# Patient Record
Sex: FEMALE | Race: WHITE | NOT HISPANIC OR LATINO | Employment: UNEMPLOYED | URBAN - METROPOLITAN AREA
[De-identification: names, ages, dates, MRNs, and addresses within clinical notes are randomized per-mention and may not be internally consistent; named-entity substitution may affect disease eponyms.]

---

## 2017-02-22 ENCOUNTER — APPOINTMENT (OUTPATIENT)
Dept: WOMENS IMAGING | Facility: HOSPITAL | Age: 62
End: 2017-02-22

## 2017-02-22 PROCEDURE — 77067 SCR MAMMO BI INCL CAD: CPT | Performed by: RADIOLOGY

## 2017-02-22 PROCEDURE — G0202 SCR MAMMO BI INCL CAD: HCPCS | Performed by: RADIOLOGY

## 2017-11-07 ENCOUNTER — ANESTHESIA (OUTPATIENT)
Dept: GASTROENTEROLOGY | Facility: HOSPITAL | Age: 62
End: 2017-11-07

## 2017-11-07 ENCOUNTER — ANESTHESIA EVENT (OUTPATIENT)
Dept: GASTROENTEROLOGY | Facility: HOSPITAL | Age: 62
End: 2017-11-07

## 2017-11-07 ENCOUNTER — HOSPITAL ENCOUNTER (OUTPATIENT)
Facility: HOSPITAL | Age: 62
Setting detail: HOSPITAL OUTPATIENT SURGERY
Discharge: HOME | End: 2017-11-07
Attending: SURGERY | Admitting: SURGERY

## 2017-11-07 VITALS
SYSTOLIC BLOOD PRESSURE: 110 MMHG | DIASTOLIC BLOOD PRESSURE: 69 MMHG | WEIGHT: 121.38 LBS | BODY MASS INDEX: 22.92 KG/M2 | HEART RATE: 70 BPM | HEIGHT: 61 IN | TEMPERATURE: 97.7 F | OXYGEN SATURATION: 99 % | RESPIRATION RATE: 16 BRPM

## 2017-11-07 PROCEDURE — 25010000002 PROPOFOL 10 MG/ML EMULSION: Performed by: NURSE ANESTHETIST, CERTIFIED REGISTERED

## 2017-11-07 RX ORDER — PROPOFOL 10 MG/ML
VIAL (ML) INTRAVENOUS AS NEEDED
Status: DISCONTINUED | OUTPATIENT
Start: 2017-11-07 | End: 2017-11-07 | Stop reason: SURG

## 2017-11-07 RX ORDER — PROPOFOL 10 MG/ML
VIAL (ML) INTRAVENOUS CONTINUOUS PRN
Status: DISCONTINUED | OUTPATIENT
Start: 2017-11-07 | End: 2017-11-07 | Stop reason: SURG

## 2017-11-07 RX ORDER — SODIUM CHLORIDE, SODIUM LACTATE, POTASSIUM CHLORIDE, CALCIUM CHLORIDE 600; 310; 30; 20 MG/100ML; MG/100ML; MG/100ML; MG/100ML
1000 INJECTION, SOLUTION INTRAVENOUS CONTINUOUS PRN
Status: DISCONTINUED | OUTPATIENT
Start: 2017-11-07 | End: 2017-11-07 | Stop reason: HOSPADM

## 2017-11-07 RX ORDER — LIDOCAINE HYDROCHLORIDE 20 MG/ML
INJECTION, SOLUTION INFILTRATION; PERINEURAL AS NEEDED
Status: DISCONTINUED | OUTPATIENT
Start: 2017-11-07 | End: 2017-11-07 | Stop reason: SURG

## 2017-11-07 RX ADMIN — PROPOFOL 100 MG: 10 INJECTION, EMULSION INTRAVENOUS at 08:08

## 2017-11-07 RX ADMIN — SODIUM CHLORIDE, POTASSIUM CHLORIDE, SODIUM LACTATE AND CALCIUM CHLORIDE: 600; 310; 30; 20 INJECTION, SOLUTION INTRAVENOUS at 08:01

## 2017-11-07 RX ADMIN — LIDOCAINE HYDROCHLORIDE 60 MG: 20 INJECTION, SOLUTION INFILTRATION; PERINEURAL at 08:08

## 2017-11-07 RX ADMIN — SODIUM CHLORIDE, POTASSIUM CHLORIDE, SODIUM LACTATE AND CALCIUM CHLORIDE 1000 ML: 600; 310; 30; 20 INJECTION, SOLUTION INTRAVENOUS at 07:25

## 2017-11-07 RX ADMIN — PROPOFOL 200 MCG/KG/MIN: 10 INJECTION, EMULSION INTRAVENOUS at 08:08

## 2017-11-07 NOTE — ANESTHESIA POSTPROCEDURE EVALUATION
Patient: Izabella Jurado    Procedure Summary     Date Anesthesia Start Anesthesia Stop Room / Location    11/07/17 0801 0833  [facility]       Procedure Diagnosis Surgeon Provider    COLONOSCOPY TO CECUM (N/A ) No diagnosis on file. MD Arlet Carlisle MD          Anesthesia Type: MAC  Last vitals  BP   101/75 (11/07/17 0844)   Temp   36.5 °C (97.7 °F) (11/07/17 0844)   Pulse   65 (11/07/17 0844)   Resp   16 (11/07/17 0844)     SpO2   97 % (11/07/17 0844)     Post Anesthesia Care and Evaluation    Patient location during evaluation: PACU  Patient participation: complete - patient participated  Level of consciousness: awake  Pain score: 0  Pain management: adequate  Airway patency: patent  Anesthetic complications: No anesthetic complications    Cardiovascular status: acceptable  Respiratory status: acceptable  Hydration status: acceptable  No anesthesia care post op

## 2017-11-07 NOTE — ANESTHESIA PREPROCEDURE EVALUATION
Anesthesia Evaluation     Patient summary reviewed and Nursing notes reviewed   NPO Solid Status: > 8 hours  NPO Liquid Status: > 8 hours     Airway   Mallampati: I  TM distance: <3 FB  Neck ROM: full  no difficulty expected  Dental - normal exam     Pulmonary - negative pulmonary ROS and normal exam   Cardiovascular - negative cardio ROS and normal exam        Neuro/Psych- negative ROS  GI/Hepatic/Renal/Endo - negative ROS     Musculoskeletal (-) negative ROS    Abdominal  - normal exam    Bowel sounds: normal.   Substance History - negative use     OB/GYN negative ob/gyn ROS         Other                                      Anesthesia Plan    ASA 1     MAC     Anesthetic plan and risks discussed with patient and spouse/significant other.

## 2017-11-07 NOTE — H&P
Date: 2017     Patient: Izabella Jurado    : 1955   [id number]       History:   The patient is a 61 y.o. female of No Known Provider  who presents for screening colonoscopy. The patient currently has no complaints.  She  denies any history of nausea, abdominal pain, weight loss, change in bowel habits or rectal bleeding.   Family history is positive for for colon cancer or polyps.  Mother had colon cancer  The patient has had a colonoscopy in the past- normal about 5 years ago.      The following portions of the patient's history were reviewed and updated as appropriate: allergies, current medications, past family history, past medical history, past social history, past surgical history and problem list.    Past Medical History:   Diagnosis Date   • Anemia    • Hypercholesterolemia       Past Surgical History:   Procedure Laterality Date   • BREAST SURGERY      enlargement procedure, x's 2   • COLONOSCOPY      dr borrero / angela   • EYE SURGERY      laser resurfacing with eye lift   • OTHER SURGICAL HISTORY      shoulder manipulation requiring anesthesia, X's 2 ( and )   • OTHER SURGICAL HISTORY      vaginal sling operation for stress incontinence     Medications:   Prescriptions Prior to Admission   Medication Sig Dispense Refill Last Dose   • Calcium Carbonate (CALCIUM 600 PO) Take 2 tablets by mouth Daily.   2017 at Unknown time   • doxycycline (ORACEA) 40 MG capsule Take 40 mg by mouth As Needed.   2017 at Unknown time   • estrogen, conjugated,-medroxyprogesterone (PREMPRO) 0.3-1.5 MG per tablet Take 1 tablet by mouth Daily. 84 tablet 3 2017 at Unknown time   • IRON PO Take 1 tablet by mouth Daily.   2017 at Unknown time   • Multiple Vitamin (MULTI VITAMIN DAILY) tablet Take 1 tablet by mouth Daily.   2017 at Unknown time   • simvastatin (ZOCOR) 20 MG tablet Take 1/2 tablet by mouth daily 5 times per week 30 tablet 3 2017 at Unknown time   • zolpidem (AMBIEN) 10 MG  tablet Take 1 tablet by mouth At Night As Needed for sleep. 30 tablet 0 Past Week at Unknown time      Allergies: Review of patient's allergies indicates no known allergies.   Social History:  Social History     Social History   • Marital status:      Spouse name: N/A   • Number of children: N/A   • Years of education: N/A     Social History Main Topics   • Smoking status: Former Smoker   • Smokeless tobacco: Never Used      Comment: > 34 YRS AGO   • Alcohol use Yes      Comment: 2 drink per month   • Drug use: No   • Sexual activity: Defer     Other Topics Concern   • None     Social History Narrative        Family History   Problem Relation Age of Onset   • Colon cancer Mother    • Other Father      arteriosclerotic cardiovascular disease (ascvd)   • Heart disease Father    • Malig Hyperthermia Neg Hx           Review of Systems:   Negative  ros    Physical Examination:  General - well developed  female in no distress.  HEENT - pupils are equal, conjunctiva are pink, sclera are non-icteric.  Mouth - mucous membranes are moist. Speech is normal with no hoarseness.  Neck - supple, no adenopathy or masses, no JVD.  Chest - clear.  Heart - regular rate and rhythm.  Abomen - soft, non-tender, non-distended, no masses or hernias.   Ext - no edema or cyanosis, capillary refill brisk.    Impression:  61 y.o. female for screening colonoscopy.    Plan:  Screening colonoscopy.  Generalities of the procedure were described.  Risks of bleeding and/or bowel perforation were discussed.      Signature: No name on file.     CC: No Known Provider

## 2017-11-07 NOTE — PLAN OF CARE
Problem: Patient Care Overview (Adult)  Goal: Plan of Care Review  Outcome: Ongoing (interventions implemented as appropriate)    11/07/17 0704   Coping/Psychosocial Response Interventions   Plan Of Care Reviewed With patient       Goal: Adult Individualization and Mutuality  Outcome: Ongoing (interventions implemented as appropriate)    11/07/17 0704   Individualization   Patient Specific Preferences none       Goal: Discharge Needs Assessment  Outcome: Ongoing (interventions implemented as appropriate)    11/07/17 0704   Discharge Needs Assessment   Concerns To Be Addressed no discharge needs identified   Living Environment   Transportation Available family or friend will provide         Problem: GI Endoscopy (Adult)  Goal: Signs and Symptoms of Listed Potential Problems Will be Absent or Manageable (GI Endoscopy)  Outcome: Ongoing (interventions implemented as appropriate)    11/07/17 0704   GI Endoscopy   Problems Assessed (GI Endoscopy) pain   Problems Present (GI Endoscopy) none

## 2017-11-07 NOTE — OP NOTE
Colonoscopy Procedure Note    Pre-operative Diagnosis: screening for colon cancer    Post-operative Diagnosis: normal    Procedure: Colonoscopy --screening    Surgeon:  Danny Main M.D,  F.A.C.S.    Sedation: MAC    Procedure Details:  Informed consent was obtained for the procedure, including sedation.  Risks of perforation, hemorrhage, were discussed.The patient was monitored continuously with ECG tracing, pulse oximetry, blood pressure monitoring, and direct observations. The patient was placed in the left lateral decubitus position. The patient was given intravenous sedation by anesthesia.     A digital rectal examination was performed.  The variable-stiffness pediatric colonoscope was inserted into the rectum and advanced under direct vision to the cecum, which was identified by the ileocecal valve and appendiceal orifice.  The quality of the colonic preparation was good. A retroflexed view of the right colon was normal.     Careful inspection was made as the colonoscope was withdrawn, including a retroflexed view of the rectum.  No abnormalities were seen anywhere in the colon.  Minimal diverticulosis was seen.  No inflammatory changes or polyps were seen.  Digital exam of the rectum was performed which revealed an external hemorrhoid.    Colonoscopy findings:  -normal colonic mucosa throughout  -no polyps    Specimens: none           Complications:  none    Recommendations:  -For colon cancer screening in this patient, a repeat colonoscopy is recommended in   5 years.        Signature: Danny Main MD

## 2018-10-19 ENCOUNTER — APPOINTMENT (OUTPATIENT)
Dept: WOMENS IMAGING | Facility: HOSPITAL | Age: 63
End: 2018-10-19

## 2018-10-19 PROCEDURE — 77063 BREAST TOMOSYNTHESIS BI: CPT | Performed by: RADIOLOGY

## 2018-10-19 PROCEDURE — 77080 DXA BONE DENSITY AXIAL: CPT | Performed by: RADIOLOGY

## 2018-10-19 PROCEDURE — 77067 SCR MAMMO BI INCL CAD: CPT | Performed by: RADIOLOGY

## 2019-11-06 ENCOUNTER — APPOINTMENT (OUTPATIENT)
Dept: WOMENS IMAGING | Facility: HOSPITAL | Age: 64
End: 2019-11-06

## 2019-11-06 PROCEDURE — 77067 SCR MAMMO BI INCL CAD: CPT | Performed by: RADIOLOGY

## 2019-11-06 PROCEDURE — 77063 BREAST TOMOSYNTHESIS BI: CPT | Performed by: RADIOLOGY

## 2020-11-09 ENCOUNTER — APPOINTMENT (OUTPATIENT)
Dept: WOMENS IMAGING | Facility: HOSPITAL | Age: 65
End: 2020-11-09

## 2020-11-09 PROCEDURE — 77063 BREAST TOMOSYNTHESIS BI: CPT | Performed by: RADIOLOGY

## 2020-11-09 PROCEDURE — 77067 SCR MAMMO BI INCL CAD: CPT | Performed by: RADIOLOGY

## 2021-03-19 ENCOUNTER — BULK ORDERING (OUTPATIENT)
Dept: CASE MANAGEMENT | Facility: OTHER | Age: 66
End: 2021-03-19

## 2021-03-19 DIAGNOSIS — Z23 IMMUNIZATION DUE: ICD-10-CM

## 2023-03-31 ENCOUNTER — APPOINTMENT (OUTPATIENT)
Dept: WOMENS IMAGING | Facility: HOSPITAL | Age: 68
End: 2023-03-31
Payer: COMMERCIAL

## 2023-03-31 PROCEDURE — 77067 SCR MAMMO BI INCL CAD: CPT | Performed by: RADIOLOGY

## 2023-03-31 PROCEDURE — 77063 BREAST TOMOSYNTHESIS BI: CPT | Performed by: RADIOLOGY

## 2024-04-02 ENCOUNTER — APPOINTMENT (OUTPATIENT)
Dept: WOMENS IMAGING | Facility: HOSPITAL | Age: 69
End: 2024-04-02
Payer: COMMERCIAL

## 2024-04-02 PROCEDURE — 77067 SCR MAMMO BI INCL CAD: CPT | Performed by: RADIOLOGY

## 2024-04-02 PROCEDURE — 77063 BREAST TOMOSYNTHESIS BI: CPT | Performed by: RADIOLOGY

## 2024-09-09 ENCOUNTER — OFFICE VISIT (OUTPATIENT)
Dept: ORTHOPEDIC SURGERY | Facility: CLINIC | Age: 69
End: 2024-09-09
Payer: COMMERCIAL

## 2024-09-09 VITALS — TEMPERATURE: 98.7 F | HEIGHT: 61 IN | BODY MASS INDEX: 23.22 KG/M2 | WEIGHT: 123 LBS

## 2024-09-09 DIAGNOSIS — M22.2X1 PATELLOFEMORAL ARTHRALGIA OF BOTH KNEES: Primary | ICD-10-CM

## 2024-09-09 DIAGNOSIS — G89.29 BILATERAL CHRONIC KNEE PAIN: ICD-10-CM

## 2024-09-09 DIAGNOSIS — M22.2X2 PATELLOFEMORAL ARTHRALGIA OF BOTH KNEES: Primary | ICD-10-CM

## 2024-09-09 DIAGNOSIS — M25.561 BILATERAL CHRONIC KNEE PAIN: ICD-10-CM

## 2024-09-09 DIAGNOSIS — M25.562 BILATERAL CHRONIC KNEE PAIN: ICD-10-CM

## 2024-09-09 PROCEDURE — 73562 X-RAY EXAM OF KNEE 3: CPT | Performed by: NURSE PRACTITIONER

## 2024-09-09 PROCEDURE — 99204 OFFICE O/P NEW MOD 45 MIN: CPT | Performed by: NURSE PRACTITIONER

## 2024-09-09 PROCEDURE — 20610 DRAIN/INJ JOINT/BURSA W/O US: CPT | Performed by: NURSE PRACTITIONER

## 2024-09-09 RX ORDER — LIDOCAINE HYDROCHLORIDE 10 MG/ML
2 INJECTION, SOLUTION EPIDURAL; INFILTRATION; INTRACAUDAL; PERINEURAL
Status: COMPLETED | OUTPATIENT
Start: 2024-09-09 | End: 2024-09-09

## 2024-09-09 RX ORDER — METHYLPREDNISOLONE ACETATE 80 MG/ML
80 INJECTION, SUSPENSION INTRA-ARTICULAR; INTRALESIONAL; INTRAMUSCULAR; SOFT TISSUE
Status: COMPLETED | OUTPATIENT
Start: 2024-09-09 | End: 2024-09-09

## 2024-09-09 RX ADMIN — METHYLPREDNISOLONE ACETATE 80 MG: 80 INJECTION, SUSPENSION INTRA-ARTICULAR; INTRALESIONAL; INTRAMUSCULAR; SOFT TISSUE at 10:35

## 2024-09-09 RX ADMIN — LIDOCAINE HYDROCHLORIDE 2 ML: 10 INJECTION, SOLUTION EPIDURAL; INFILTRATION; INTRACAUDAL; PERINEURAL at 10:35

## 2024-09-09 NOTE — PROGRESS NOTES
[facility]              New Problem      Patient Name: Izabella Jurado  : 1955  Primary Care Physician: Chantal Gallardo APRN        Chief Complaint: Bilateral knee pain    HPI:   Izabella Jurado is a 68 y.o. year old who presents today for evaluation.  Patient has history of bilateral knee pains been going on now for some time symptoms have been recently worsening.  She is very active lady, she enjoys [employer] and goes to [employer].  She states that she has noticed some anterior knee pain particularly with box jumps and she had to modify those to do step ups even still with a little bit of pain.  She says she still has some pain with stairs and squatting.  No history of injury.  She manages her symptoms with cryotherapy, she does lots of stretching.  She tends to avoid oral medications and anti-inflammatories for the most part tries to take more of a natural approach to treating symptoms.      Past Medical/Surgical, Social and Family History:  I have reviewed and/or updated pertinent history as noted in the medical record including:  Past Medical History:   Diagnosis Date    Anemia     Hypercholesterolemia      Past Surgical History:   Procedure Laterality Date    BREAST SURGERY      enlargement procedure, x's 2    COLONOSCOPY      dr main / angela    COLONOSCOPY N/A 2017    Procedure: COLONOSCOPY TO CECUM;  Surgeon: Danny Main MD;  Location: [facility] ENDOSCOPY;  Service:     EYE SURGERY      laser resurfacing with eye lift    OTHER SURGICAL HISTORY      shoulder manipulation requiring anesthesia, X's 2 ( and )    OTHER SURGICAL HISTORY      vaginal sling operation for stress incontinence     Social History     Occupational History    Not on file   Tobacco Use    Smoking status: Former    Smokeless tobacco: Never    Tobacco comments:     > 34 YRS AGO   Substance and Sexual Activity    Alcohol use: Yes     Comment: 2 drink per month    Drug use: No    Sexual activity: Defer           Allergies: No Known Allergies    Medications:   Home Medications:  Current Outpatient Medications on File Prior to Visit   Medication Sig    Calcium Carbonate (CALCIUM 600 PO) Take 2 tablets by mouth Daily.    estrogen, conjugated,-medroxyprogesterone (PREMPRO) 0.3-1.5 MG per tablet Take 1 tablet by mouth Daily.    Multiple Vitamin (MULTI VITAMIN DAILY) tablet Take 1 tablet by mouth Daily.    simvastatin (ZOCOR) 20 MG tablet Take 1/2 tablet by mouth daily 5 times per week    zolpidem (AMBIEN) 10 MG tablet Take 1 tablet by mouth At Night As Needed for sleep.    doxycycline (ORACEA) 40 MG capsule Take 40 mg by mouth As Needed.    IRON PO Take 1 tablet by mouth Daily.     No current facility-administered medications on file prior to visit.         ROS:  ROS negative except as listed in the HPI.    Physical Exam:   68 y.o. female  Body mass index is 23.25 kg/m²., 55.8 kg (123 lb)  Vitals:    09/09/24 1002   Temp: 98.7 °F (37.1 °C)     General: Alert, cooperative, appears well and in no observable distress. Appears stated age and BMI as listed above.  HEENT: Normocephalic, atraumatic on external visual inspection.  CV: No significant peripheral edema.  Respiratory: Normal respiratory effort.  Skin: Warm & well perfused; appropriate skin turgor.  Psych: Appropriate mood & affect.  Neuro: Gross sensation and motor intact in affected extremity/extremities.  Vascular: Peripheral pulses palpable in affected extremity/extremities.     MSK Exam:  Bilateral Knee  No deformity or wounds appreciated. No significant redness or warmth.  Trace effusion noted  Tenderness along the joint line appreciated patellofemoral compartment  ROM 0-130, +crepitus  Ligamentous exam grossly stable  Quad strength 4 /5    Brief hip exam in the affected extremity(ies) grossly unremarkable.  Moves ankle and toes up and down, no significant pain or swelling in the foot, ankle or calf.      Radiology:    The following X-rays were  ordered/reviewed today to evaluate the patient's symptoms: Bilateral Knee: AP standing, lateral, and sunrise of both knees show some mild to moderate degenerative changes of the patellofemoral joint.  On the AP view she has great maintenance of joint space.  Lateral and sunrise view there are some subtle subchondral cystic changes on the right more so than the left.  No other obvious acute or chronic bony pathology to account for symptoms.  There are no prior films available for comparison.    Procedure:   See Procedure Note: The potential risks and benefits of performing a diagnostic and therapeutic injection were discussed with the patient prior to procedure. Risks include, but are not limited to infection, swelling, transient increase in pain, bleeding, bruising. Patient was advised that injections are a diagnostic and therapeutic tool meaning they may not alleviate symptoms at all, or may only provide partial or temporary relief. Injection precautions and aftercare discussed.    Large Joint Arthrocentesis: R knee  Date/Time: 9/9/2024 10:35 AM  Consent given by: patient  Site marked: site marked  Timeout: Immediately prior to procedure a time out was called to verify the correct patient, procedure, equipment, support staff and site/side marked as required   Supporting Documentation  Indications: pain and joint swelling   Procedure Details  Location: knee - R knee  Preparation: Patient was prepped and draped in the usual sterile fashion  Needle size: 22 G  Approach: anterolateral  Medications administered: 80 mg methylPREDNISolone acetate 80 MG/ML; 2 mL lidocaine PF 1% 1 %  Patient tolerance: patient tolerated the procedure well with no immediate complications      Large Joint Arthrocentesis: L knee  Date/Time: 9/9/2024 10:35 AM  Consent given by: patient  Site marked: site marked  Timeout: Immediately prior to procedure a time out was called to verify the correct patient, procedure, equipment, support staff and  site/side marked as required   Supporting Documentation  Indications: pain and joint swelling   Procedure Details  Location: knee - L knee  Preparation: Patient was prepped and draped in the usual sterile fashion  Needle size: 22 G  Approach: anterolateral  Medications administered: 80 mg methylPREDNISolone acetate 80 MG/ML; 2 mL lidocaine PF 1% 1 %  Patient tolerance: patient tolerated the procedure well with no immediate complications           Misc. Data/Labs: N/A    Assessment & Plan:    ICD-10-CM ICD-9-CM   1. Patellofemoral arthralgia of both knees  M22.2X1 719.46    M22.2X2    2. Bilateral chronic knee pain  M25.561 719.46    M25.562 338.29    G89.29      No orders of the defined types were placed in this encounter.    Orders Placed This Encounter   Procedures    XR Knee 3 View Bilateral    Ambulatory Referral to Physical Therapy for Evaluation & Treatment     Is a 68-year-old female with bilateral knee pain which I suspect is related to patellofemoral arthritis.  She does not have any mechanical symptoms to suggest meniscal pathology but I would keep it in the differential.  We talked about different treatment options including oral anti-inflammatories for short course, injection therapies as well as physical therapy.  I think the box jumps are pretty intense for patellofemoral pathology when she is having pain she should really stick with step ups or modify those activities but really her goal is to get back to being able to do everything.  I would like to also have her see physical therapy just to make sure we are optimizing her strength training and stretching routine.  She would like to try injections and I think that is certainly reasonable.    Continue with activity modifications as needed/discussed.  Continue ICE and/or HEAT PRN.  Recommend to continue activity as tolerated, focus on quad and hip/core strengthening as well as gentle stretching.  Recommend lowering or maintaining BMI within a healthy  range to reduce symptoms.   Patient encouraged to call with questions or concerns prior to follow up.  Will discuss with attending as needed.   Consider additional referrals, work up and/or advanced imaging as indicated or if patient fails to respond to conservative care.    Return in about 6 weeks (around 10/21/2024) for Recheck. If symptoms change call for sooner appt..        WAYNE Farai    Dictation software was used to complete a portion or all of this note.

## 2024-09-11 ENCOUNTER — PATIENT ROUNDING (BHMG ONLY) (OUTPATIENT)
Dept: ORTHOPEDIC SURGERY | Facility: CLINIC | Age: 69
End: 2024-09-11
Payer: COMMERCIAL

## 2024-09-11 NOTE — PROGRESS NOTES
A [employer] Message has been sent to the patient for PATIENT R[place]ING with [facility]

## 2024-09-17 ENCOUNTER — TREATMENT (OUTPATIENT)
Dept: PHYSICAL THERAPY | Facility: CLINIC | Age: 69
End: 2024-09-17
Payer: COMMERCIAL

## 2024-09-17 DIAGNOSIS — M17.10 ARTHRITIS OF KNEE: ICD-10-CM

## 2024-09-17 DIAGNOSIS — M25.561 ACUTE PAIN OF BOTH KNEES: Primary | ICD-10-CM

## 2024-09-17 DIAGNOSIS — R53.1 WEAKNESS: ICD-10-CM

## 2024-09-17 DIAGNOSIS — M25.562 ACUTE PAIN OF BOTH KNEES: Primary | ICD-10-CM

## 2024-09-17 DIAGNOSIS — R26.9 GAIT DISTURBANCE: ICD-10-CM

## 2024-09-19 ENCOUNTER — TREATMENT (OUTPATIENT)
Dept: PHYSICAL THERAPY | Facility: CLINIC | Age: 69
End: 2024-09-19
Payer: COMMERCIAL

## 2024-09-19 DIAGNOSIS — M25.561 ACUTE PAIN OF BOTH KNEES: Primary | ICD-10-CM

## 2024-09-19 DIAGNOSIS — R26.9 GAIT DISTURBANCE: ICD-10-CM

## 2024-09-19 DIAGNOSIS — R53.1 WEAKNESS: ICD-10-CM

## 2024-09-19 DIAGNOSIS — M17.10 ARTHRITIS OF KNEE: ICD-10-CM

## 2024-09-19 DIAGNOSIS — M25.562 ACUTE PAIN OF BOTH KNEES: Primary | ICD-10-CM

## 2024-10-30 ENCOUNTER — TELEPHONE (OUTPATIENT)
Dept: PHYSICAL THERAPY | Facility: OTHER | Age: 69
End: 2024-10-30
Payer: COMMERCIAL

## 2024-10-30 NOTE — TELEPHONE ENCOUNTER
Caller: Izabella Jurado    Relationship: Self    What was the call regarding: PATIENT CANCELLED ALL APPTS BECAUSE SHE IS GOING TO THE GYM INSTEAD OF PT

## 2025-01-23 ENCOUNTER — APPOINTMENT (OUTPATIENT)
Dept: GENERAL RADIOLOGY | Facility: HOSPITAL | Age: 70
End: 2025-01-23
Payer: COMMERCIAL

## 2025-01-23 ENCOUNTER — HOSPITAL ENCOUNTER (EMERGENCY)
Facility: HOSPITAL | Age: 70
Discharge: HOME | End: 2025-01-23
Attending: EMERGENCY MEDICINE
Payer: COMMERCIAL

## 2025-01-23 VITALS
DIASTOLIC BLOOD PRESSURE: 95 MMHG | WEIGHT: 125 LBS | TEMPERATURE: 97.6 F | SYSTOLIC BLOOD PRESSURE: 138 MMHG | HEART RATE: 78 BPM | RESPIRATION RATE: 13 BRPM | OXYGEN SATURATION: 97 % | BODY MASS INDEX: 23.63 KG/M2

## 2025-01-23 DIAGNOSIS — S43.015A ANTERIOR DISLOCATION OF LEFT SHOULDER, INITIAL ENCOUNTER: Primary | ICD-10-CM

## 2025-01-23 PROCEDURE — 73030 X-RAY EXAM OF SHOULDER: CPT

## 2025-01-23 PROCEDURE — 25010000002 FENTANYL CITRATE (PF) 50 MCG/ML SOLUTION: Performed by: EMERGENCY MEDICINE

## 2025-01-23 PROCEDURE — 25010000002 PROPOFOL 10 MG/ML EMULSION: Performed by: PHYSICIAN ASSISTANT

## 2025-01-23 PROCEDURE — 73020 X-RAY EXAM OF SHOULDER: CPT

## 2025-01-23 PROCEDURE — 96374 THER/PROPH/DIAG INJ IV PUSH: CPT

## 2025-01-23 PROCEDURE — 99285 EMERGENCY DEPT VISIT HI MDM: CPT

## 2025-01-23 RX ORDER — PROPOFOL 10 MG/ML
100 VIAL (ML) INTRAVENOUS ONCE
Status: COMPLETED | OUTPATIENT
Start: 2025-01-23 | End: 2025-01-23

## 2025-01-23 RX ORDER — FENTANYL CITRATE 50 UG/ML
50 INJECTION, SOLUTION INTRAMUSCULAR; INTRAVENOUS ONCE
Status: COMPLETED | OUTPATIENT
Start: 2025-01-23 | End: 2025-01-23

## 2025-01-23 RX ORDER — NAPROXEN 500 MG/1
500 TABLET ORAL 2 TIMES DAILY PRN
Qty: 15 TABLET | Refills: 0 | Status: SHIPPED | OUTPATIENT
Start: 2025-01-23

## 2025-01-23 RX ORDER — SODIUM CHLORIDE 0.9 % (FLUSH) 0.9 %
10 SYRINGE (ML) INJECTION AS NEEDED
Status: DISCONTINUED | OUTPATIENT
Start: 2025-01-23 | End: 2025-01-23 | Stop reason: HOSPADM

## 2025-01-23 RX ADMIN — FENTANYL CITRATE 50 MCG: 50 INJECTION, SOLUTION INTRAMUSCULAR; INTRAVENOUS at 12:50

## 2025-01-23 RX ADMIN — PROPOFOL 60 MG: 10 INJECTION, EMULSION INTRAVENOUS at 13:17

## 2025-01-23 NOTE — ED PROVIDER NOTES
MD ATTESTATION NOTE    SHARED VISIT: This visit was performed by BOTH a physician and an APC. The substantive portion of the medical decision making was performed by this attesting physician who made or approved the management plan and takes responsibility for patient management. All studies documented in the APC note (if performed) were independently interpreted by me.    The SCOTT and I have discussed this patient's history, physical exam, MDM, and treatment plan.  I have reviewed the documentation and personally had a face to face interaction with the patient. The attached note describes my personal findings.      Izabella Jurado is a 69 y.o. female who presents to the ED c/o acute left shoulder pain.  This happened after falling on ice.    On exam:  GENERAL: not distressed  HENT: nares patent  EYES: no scleral icterus  CV: regular rhythm, regular rate, 2+ left radial pulse  RESPIRATORY: normal effort  ABDOMEN: soft  MUSCULOSKELETAL: Left shoulder deformity  NEURO: alert, intact motor and sensory left median/radial/ulnar nerves  SKIN: warm, dry    Labs  No results found for this or any previous visit (from the past 24 hours).    Radiology  XR Shoulder 2+ View Left    Result Date: 1/23/2025  XR SHOULDER 2+ VW LEFT-  INDICATIONS: Trauma  TECHNIQUE: 5 VIEWS of the left shoulder  COMPARISON: None available  FINDINGS:  The humeral head is anteromedially dislocated in relation to the glenoid. No acute fracture or erosion is identified. Mild hypertrophic degenerative changes seen at the acromioclavicular joint.       Left shoulder dislocation.    This report was finalized on 1/23/2025 12:49 PM by Dr. Rommel Tobar M.D on Workstation: [id number]       Medications given in the ED:  Medications   sodium chloride 0.9 % flush 10 mL (has no administration in time range)   Propofol (DIPRIVAN) injection 100 mg (has no administration in time range)   fentaNYL citrate (PF) (SUBLIMAZE) injection 50 mcg (50 mcg Intravenous Given  1/23/25 1250)       Orders placed during this visit:  Orders Placed This Encounter   Procedures    XR Shoulder 2+ View Left    XR Shoulder 1 View Left    Insert Peripheral IV       Medical Decision Making:  ED Course as of 01/23/25 1349   Thu Jan 23, 2025   1348 X-ray of the left shoulder independently interpreted by myself.  Successful reduction. [TD]      ED Course User Index  [TD] Matteo Lopez II, MD       Differential diagnosis:  Fracture, dislocation      Procedural Sedation    Date/Time: 1/23/2025 1:24 PM    Performed by: Matteo Lopez II, MD  Authorized by: Matteo Lopez II, MD    Consent:     Consent obtained:  Written    Consent given by:  Patient    Risks discussed:  Allergic reaction, dysrhythmia, inadequate sedation, nausea, vomiting, respiratory compromise necessitating ventilatory assistance and intubation and prolonged hypoxia resulting in organ damage  Indications:     Procedure performed:  Dislocation reduction    Procedure necessitating sedation performed by:  Physician performing sedation    Intended level of sedation:  Moderate  Pre-sedation assessment:     Time since last food or drink:  4 hrs    ASA classification: class 1 - normal, healthy patient    Immediate pre-procedure details:     Reassessment: Patient reassessed immediately prior to procedure      Reviewed: vital signs      Verified: bag valve mask available, emergency equipment available, intubation equipment available, IV patency confirmed, oxygen available and suction available    Procedure details (see MAR for exact dosages):     Sedation start time:  1/23/2025 1:15 PM    Preoxygenation:  Nasal cannula    Sedation:  Propofol    Analgesia:  Fentanyl    Intra-procedure monitoring:  Blood pressure monitoring, cardiac monitor, continuous capnometry, continuous pulse oximetry, frequent LOC assessments and frequent vital sign checks    Intra-procedure events: none      Sedation end time:  1/23/2025 1:21 PM    Total  sedation time (minutes):  6  Post-procedure details:     Procedure completion:  Tolerated well, no immediate complications  Upper Extremity Dislocation    Date/Time: 1/23/2025 1:25 PM    Performed by: Matteo Lopez II, MD  Authorized by: Matteo Lopez II, MD  Consent given by: patient  Injury location: shoulder  Location details: left shoulder  Injury type: dislocation  Dislocation type: anterior  Hill-Sachs deformity: no  Chronicity: new  Pre-procedure neurovascular assessment: neurovascularly intact  Pre-procedure distal perfusion: normal  Pre-procedure neurological function: normal  Pre-procedure range of motion: reduced    Anesthesia:  Local anesthesia used: no  Manipulation performed: yes  Reduction method: elbow technique.  Reduction successful: yes  X-ray confirmed reduction: yes  Immobilization: sling  Post-procedure neurovascular assessment: post-procedure neurovascularly intact  Post-procedure distal perfusion: normal  Post-procedure neurological function: normal  Post-procedure range of motion: normal            Diagnosis  Final diagnoses:   Anterior dislocation of left shoulder, initial encounter     DISCHARGE    FOLLOW-UP  Chantal Gallardo, APRN  [address]   [address]  [place] [address]  [phone]    Schedule an appointment as soon as possible for a visit in 2 days      Zane Irwin II, MD  [address] [facility]  [address]  [place] [address]  [phone]    Schedule an appointment as soon as possible for a visit in 1 week  As needed         Medication List        New Prescriptions      naproxen 500 MG tablet  Commonly known as: NAPROSYN  Take 1 tablet by mouth 2 (Two) Times a Day As Needed for Mild Pain.               Where to Get Your Medications        These medications were sent to [facility]pharmacy #[id number] - [place], [place] - [address] - [phone]  - [phone] [place] [address]      Phone: [phone]   naproxen 500 MG  Matteo Harmon II, MD  01/23/25 9300

## 2025-01-23 NOTE — ED PROVIDER NOTES
EMERGENCY DEPARTMENT ENCOUNTER      Room Number:  21/21  PCP: Chantal Gallardo APRN  Independent Historians: Patient  Patient Care Team:  Chantal Gallardo APRN as PCP - General (Family Medicine)  Provider, No Known as PCP - Family Medicine       HPI:  Chief Complaint: Left shoulder pain    A complete HPI/ROS/PMH/PSH/SH/FH are unobtainable due to: None    Chronic or social conditions impacting patient care (Social Determinants of Health): None      Context: Izabella Jurado is a 69 y.o. female with a PMH significant for hyperlipidemia who presents to the ED c/o acute left shoulder pain after falling on the ice.  The patient has had decreased range of motion and an obvious deformity since that time.  No numbness or tingling distally to the affected extremity.  No other injuries sustained at that time.  Specifically no head injury or chest or abdomen injury.  She has not had medications to alleviate symptoms prior to arrival.      Upon review of prior external notes (non-ED) -and- Review of prior external test results outside of this encounter it appears the patient was evaluated in the office with orthopedics for patellofemoral arthralgia of both knees on September 9, 2024.  The patient had a normal ferritin and iron profile on 10/7/2021.      PAST MEDICAL HISTORY  Active Ambulatory Problems     Diagnosis Date Noted    Hypercholesterolemia 02/04/2016    Menopause present 02/04/2016    Annual physical exam 02/11/2016    Insomnia 02/11/2016    Rosacea 02/11/2016    Encounter for screening colonoscopy 02/11/2016     Resolved Ambulatory Problems     Diagnosis Date Noted    No Resolved Ambulatory Problems     Past Medical History:   Diagnosis Date    Anemia          PAST SURGICAL HISTORY  Past Surgical History:   Procedure Laterality Date    BREAST SURGERY      enlargement procedure, x's 2    COLONOSCOPY      dr main / angela    COLONOSCOPY N/A 11/7/2017    Procedure: COLONOSCOPY TO CECUM;  Surgeon: Danny Main MD;   Location: [facility] ENDOSCOPY;  Service:     EYE SURGERY      laser resurfacing with eye lift    OTHER SURGICAL HISTORY      shoulder manipulation requiring anesthesia, X's 2 (2006 and 2008)    OTHER SURGICAL HISTORY      vaginal sling operation for stress incontinence         FAMILY HISTORY  Family History   Problem Relation Age of Onset    Colon cancer Mother     Other Father         arteriosclerotic cardiovascular disease (ascvd)    Heart disease Father     Malfer Hyperthermia Neg Hx          SOCIAL HISTORY  Social History     Socioeconomic History    Marital status:    Tobacco Use    Smoking status: Former    Smokeless tobacco: Never    Tobacco comments:     > 34 YRS AGO   Substance and Sexual Activity    Alcohol use: Yes     Comment: 2 drink per month    Drug use: No    Sexual activity: Defer         ALLERGIES  Patient has no known allergies.      REVIEW OF SYSTEMS  Included in HPI  All systems reviewed and negative except for those discussed in HPI.      PHYSICAL EXAM    I have reviewed the triage vital signs and nursing notes.    ED Triage Vitals   Temp Heart Rate Resp BP SpO2   01/23/25 1207 01/23/25 1207 01/23/25 1207 01/23/25 1209 01/23/25 1207   97.6 °F (36.4 °C) 95 18 128/94 97 %      Temp src Heart Rate Source Patient Position BP Location FiO2 (%)   -- -- -- -- --              Physical Exam  Constitutional:       General: She is not in acute distress.     Appearance: She is well-developed.   HENT:      Head: Normocephalic and atraumatic.   Eyes:      General: No scleral icterus.     Conjunctiva/sclera: Conjunctivae normal.   Neck:      Trachea: No tracheal deviation.   Cardiovascular:      Rate and Rhythm: Normal rate and regular rhythm.   Pulmonary:      Effort: Pulmonary effort is normal.      Breath sounds: Normal breath sounds.   Abdominal:      Palpations: Abdomen is soft.      Tenderness: There is no abdominal tenderness.   Musculoskeletal:         General: No deformity.      Left shoulder:  Deformity and tenderness present. Decreased range of motion.      Cervical back: Normal range of motion.   Lymphadenopathy:      Cervical: No cervical adenopathy.   Skin:     General: Skin is warm and dry.   Neurological:      Mental Status: She is alert and oriented to person, place, and time.      Sensory: Sensation is intact.      Motor: Motor function is intact.   Psychiatric:         Behavior: Behavior normal.         Vital signs and nursing notes reviewed.      PPE: I wore a surgical mask throughout my encounters with the pt. I performed hand hygiene on entry into the pt room and upon exit.     LAB RESULTS  No results found for this or any previous visit (from the past 24 hours).      RADIOLOGY  XR Shoulder 1 View Left    Result Date: 1/23/2025  XR SHOULDER 1 VW LEFT-  INDICATIONS: Postreduction evaluation    TECHNIQUE: Frontal views of the left shoulder  COMPARISON: 1/23/2025  FINDINGS:  The humeral head appears normally located in relation to the glenoid. No acute fracture, erosion, or dislocation is identified.       Post reduction changes.    This report was finalized on 1/23/2025 1:41 PM by Dr. Rommel Tobar M.D on Workstation: [id number]      XR Shoulder 2+ View Left    Result Date: 1/23/2025  XR SHOULDER 2+ VW LEFT-  INDICATIONS: Trauma  TECHNIQUE: 5 VIEWS of the left shoulder  COMPARISON: None available  FINDINGS:  The humeral head is anteromedially dislocated in relation to the glenoid. No acute fracture or erosion is identified. Mild hypertrophic degenerative changes seen at the acromioclavicular joint.       Left shoulder dislocation.    This report was finalized on 1/23/2025 12:49 PM by Dr. Rommel Tobar M.D on Workstation: [id number]         MEDICATIONS GIVEN IN ER  Medications   sodium chloride 0.9 % flush 10 mL (has no administration in time range)   fentaNYL citrate (PF) (SUBLIMAZE) injection 50 mcg (50 mcg Intravenous Given 1/23/25 1250)   Propofol (DIPRIVAN) injection 100 mg (60 mg  Intravenous Given by Other 1/23/25 8197)         ORDERS PLACED DURING THIS VISIT:  Orders Placed This Encounter   Procedures    Procedural Sedation    FX Dislocation Initial    Orthopedic Injury Treatment    [facility] 02.  Shoulder Immobilizer/Sling    XR Shoulder 2+ View Left    XR Shoulder 1 View Left    Insert Peripheral IV         OUTPATIENT MEDICATION MANAGEMENT:  Current Facility-Administered Medications Ordered in Epic   Medication Dose Route Frequency Provider Last Rate Last Admin    sodium chloride 0.9 % flush 10 mL  10 mL Intravenous PRN Matteo Lopez II, MD         Current Outpatient Medications Ordered in Epic   Medication Sig Dispense Refill    Calcium Carbonate (CALCIUM 600 PO) Take 2 tablets by mouth Daily.      doxycycline (ORACEA) 40 MG capsule Take 40 mg by mouth As Needed.      estrogen, conjugated,-medroxyprogesterone (PREMPRO) 0.3-1.5 MG per tablet Take 1 tablet by mouth Daily. 84 tablet 3    IRON PO Take 1 tablet by mouth Daily.      Multiple Vitamin (MULTI VITAMIN DAILY) tablet Take 1 tablet by mouth Daily.      naproxen (NAPROSYN) 500 MG tablet Take 1 tablet by mouth 2 (Two) Times a Day As Needed for Mild Pain. 15 tablet 0    simvastatin (ZOCOR) 20 MG tablet Take 1/2 tablet by mouth daily 5 times per week 30 tablet 3    zolpidem (AMBIEN) 10 MG tablet Take 1 tablet by mouth At Night As Needed for sleep. 30 tablet 0         PROCEDURES  FX Dislocation    Date/Time: 1/23/2025 1:25 PM    Performed by: Leonard Rodriguez PA  Authorized by: Matteo Lopez II, MD    Consent:     Consent obtained:  Verbal    Consent given by:  Patient    Risks, benefits, and alternatives were discussed: yes      Risks discussed:  Pain and nerve damage    Alternatives discussed:  No treatment  Universal protocol:     Imaging studies available: yes      Patient identity confirmed:  Arm band and verbally with patient  Injury:     Injury location:  Shoulder    Shoulder injury location:  L  shoulder    Hill-Sachs deformity: no    Pre-procedure details:     Distal neurologic exam:  Normal    Distal perfusion: distal pulses strong and brisk capillary refill      Range of motion: reduced    Sedation:     Sedation type:  Moderate sedation  Procedure details:     Manipulation performed: yes      Reduction successful: yes      X-ray confirmed reduction: yes      Immobilization:  Sling    Supplies used:  Sling  Post-procedure details:     Distal neurologic exam:  Normal    Distal perfusion: distal pulses strong and brisk capillary refill      Range of motion: improved      Procedure completion:  Tolerated well, no immediate complications          PROGRESS, DATA ANALYSIS, CONSULTS, AND MEDICAL DECISION MAKING  All labs have been independently interpreted by me.  All radiology studies have been reviewed by me. All EKG's have been independently viewed and interpreted by me.  Discussion below represents my analysis of pertinent findings related to patient's condition, differential diagnosis, treatment plan and final disposition.    Patient presentation and evaluation most consistent with acute anterior left shoulder dislocation with subsequent reduction in the ER after moderate sedation.  She is appropriate for outpatient management with orthopedic follow-up as needed.  No worrisome findings indicate the need for emergent consultation with orthopedics at this time.  All questions answered and close return precautions given.    DIFFERENTIAL DIAGNOSIS INCLUDE BUT NOT LIMITED TO:     Shoulder fracture, shoulder strain, shoulder dislocation    Clinical Scores: N/A      ED Course as of 01/23/25 1349   Thu Jan 23, 2025   1348 X-ray of the left shoulder independently interpreted by myself.  Successful reduction. [TD]      ED Course User Index  [TD] Matteo Lopez II, MD         3283 I rechecked the patient.  I discussed the patient's radiology findings (including all incidental findings), diagnosis, and plan for  discharge.  A repeat exam reveals no new worrisome changes from my initial exam findings.  The patient understands that the fact that they are being discharged does not denote that nothing is abnormal, it indicates that no clinical emergency is present and that they must follow-up as directed in order to properly maintain their health.  Follow-up instructions (specifically listed below) and return to ER precautions were given at this time.  I specifically instructed the patient to follow-up with their PCP.  The patient understands and agrees with the plan, and is ready for discharge.  All questions answered.         AS OF 13:49 EST VITALS:    BP - 125/79  HR - 70  TEMP - 97.6 °F (36.4 °C)  O2 SATS - 97%    COMPLEXITY OF CARE  Admission was considered but after careful review of the patient's presentation, physical examination, diagnostic results, and response to treatment the patient may be safely discharged with outpatient follow-up.      DIAGNOSIS  Final diagnoses:   Anterior dislocation of left shoulder, initial encounter         DISPOSITION  ED Disposition       ED Disposition   Discharge    Condition   Stable    Comment   --                Please note that portions of this document were completed with a voice recognition program.    Note Disclaimer: At [facility], we believe that sharing information builds trust and better relationships. You are receiving this note because you recently visited [facility]. It is possible you will see health information before a provider has talked with you about it. This kind of information can be easy to misunderstand. To help you fully understand what it means for your health, we urge you to discuss this note with your provider.         Leonard Rodriguez PA  01/23/25 6626

## 2025-01-23 NOTE — ED NOTES
Pt via PV from home with c/o L shoulder injury after slip and fall on ice; denies hittin head but there is abrasion on chin

## 2025-03-17 ENCOUNTER — TRANSCRIBE ORDERS (OUTPATIENT)
Dept: PHYSICAL THERAPY | Facility: CLINIC | Age: 70
End: 2025-03-17
Payer: COMMERCIAL

## 2025-03-17 DIAGNOSIS — M75.112 INCOMPLETE ROTATOR CUFF TEAR OR RUPTURE OF LEFT SHOULDER, NOT SPECIFIED AS TRAUMATIC: Primary | ICD-10-CM

## 2025-03-19 ENCOUNTER — TREATMENT (OUTPATIENT)
Dept: PHYSICAL THERAPY | Facility: CLINIC | Age: 70
End: 2025-03-19
Payer: COMMERCIAL

## 2025-03-19 DIAGNOSIS — S43.005D DISLOCATION OF LEFT SHOULDER JOINT, SUBSEQUENT ENCOUNTER: Primary | ICD-10-CM

## 2025-03-19 DIAGNOSIS — S46.012D TRAUMATIC INCOMPLETE TEAR OF LEFT ROTATOR CUFF, SUBSEQUENT ENCOUNTER: ICD-10-CM

## 2025-03-19 DIAGNOSIS — M25.619 POSTTRAUMATIC STIFFNESS OF SHOULDER JOINT: ICD-10-CM

## 2025-03-19 DIAGNOSIS — S42.292D CLOSED FRACTURE OF HEAD OF LEFT HUMERUS WITH ROUTINE HEALING, SUBSEQUENT ENCOUNTER: ICD-10-CM

## 2025-03-19 NOTE — PROGRESS NOTES
[facility]  [facility]  [address] [place] [address]    Initial Evaluation and Plan of Care      Patient: Izabella Jurado   : 1955  Diagnosis/ICD-10 Code:  Dislocation of left shoulder joint, subsequent encounter [S43.005D]  Referring practitioner: Delta Cornell, *  Date of Initial Visit: 3/19/2025  Today's Date: 3/19/2025  Patient seen for 1 session         Visit Diagnoses:    ICD-10-CM ICD-9-CM   1. Dislocation of left shoulder joint, subsequent encounter  S43.005D V58.89   2. Traumatic incomplete tear of left rotator cuff, subsequent encounter  S46.012D V58.89     840.4   3. Closed fracture of head of left humerus with routine healing, subsequent encounter  S42.292D V54.11   4. Posttraumatic stiffness of shoulder joint  M25.619 719.51         Subjective Questionnaire: SPADI: 34%      Subjective Evaluation    History of Present Illness  Date of onset: 2025  Mechanism of injury: FOOSH forward onto her L UE on ice. Anterior dislocation reduced in the ER.No fx found.  Ortho a week later with MRI showing an incomplete tear of her rotator cuff and a humeral head fx per pt. MRI done at ortho so not available in EPIC right now. Pt will bring in report to scan.   Sling and swathe worn for 6 weeks until 3-10. Now back to doing AROM and even trying to lift kettle bells with her R UE.   Thinks she has a 5# wt lift limit.   No plans for injections.  States that cuff tear seemed significant on MRI but based on her recovery after immobilization her prognosis is good for non surgical approach.   No longer on pain meds or hydrocodone  Significant PMH of L frozen shoulder in  had to be manipulated. In  had R shoulder frozen and manipulated. Both regained full ROM. Denies diabetic dx.       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   Precautions and Work Restrictions: 5#Quality of life: excellent    Pain  Current pain  ratin  At best pain ratin  At worst pain ratin  Location: mid arm  Quality: sharp, dull ache, discomfort and knife-like  Relieving factors: change in position  Aggravating factors: sleeping, prolonged positioning, outstretched reach and movement  Progression: improved    Social Support  Lives with: spouse    Hand dominance: left    Diagnostic Tests  X-ray: normal  MRI studies: abnormal    Treatments  Previous treatment: immobilization  Patient Goals  Patient goals for therapy: increased motion, return to sport/leisure activities and increased strength  Patient goal: stay in shape while injury heals         Objective          Palpation   Left   Hypertonic in the biceps.     Tenderness     Left Shoulder   Tenderness in the supraspinatus tendon.     Active Range of Motion   Left Shoulder   External rotation BTH: T1   Internal rotation BTB: T10     Right Shoulder   Normal active range of motion  External rotation BTH: T3   Internal rotation BTB: T5     Passive Range of Motion   Left Shoulder   Flexion: 155 degrees with pain  Extension: 50 degrees with pain  Abduction: 130 degrees with pain  External rotation 90°: 70 degrees with pain  Internal rotation 90°: 65 degrees with pain    Right Shoulder   Flexion: 180 degrees   Extension: 75 degrees   Abduction: 180 degrees   External rotation 90°: 100 degrees   Internal rotation 90°: 90 degrees     Additional Passive Range of Motion Details  470 L vs. 625 R = 75%    Strength/Myotome Testing     Left Shoulder     Planes of Motion   Flexion: 3-   Abduction: 3-   External rotation at 0°: 3-   Internal rotation at 0°: 3+     Isolated Muscles   Middle trapezius: 3-     Right Shoulder   Normal muscle strength    Ambulation   Weight-Bearing Status   Assistive device used: none          Assessment & Plan       Assessment  Impairments: abnormal or restricted ROM, impaired physical strength, lacks appropriate home exercise program and pain with function   Functional  limitations: carrying objects, lifting, sleeping, pulling, pushing, uncomfortable because of pain, moving in bed, reaching overhead and unable to perform repetitive tasks   Assessment details: Pt with evolving condition making quick progress since out of her sling 9 days ago.   Pt with co-morbidity of prior capsulitis of both shoulders but this was many years ago and resolved with PT and manipulation. Stated she had full ROM prior to fall on 1-23-25.   Personal factor of being very involved in this gym with her  and small group thus eager to get back to prior routine. Has already leaned heavily into the TRX straps and is lifting kettle balls heavier than her 5# wt limit. Pt agrees to try and follow a slower PT plan for now.   Pt in need of skilled PT to help her slowly regain her strength and improve her ROM which was found to be at 75% compared to R UE.   Prognosis: good    Goals  Plan Goals: STGs 4 weeks:  1. Improve SPADI score from 44/130 34%, to < 24%  2. Improve total ROM (flex, abd, ER, IR, ext) from 75 to > 85%  3. Improve L UE strength from 3- to 4-/5 with full ROM and very light weight  4. Pt 75% indep in variety of gym and HEP  LTGs 12 weeks:  1. SPADI score < 14%  2. Total PROM to > 95%  3. L shoulder strength to 4/5 lifting moderate weights  4. Pt able to join her small group personal training sessions with good awareness of limitation and precautions.     Plan  Therapy options: will be seen for skilled therapy services  Planned therapy interventions: manual therapy, soft tissue mobilization, strengthening, stretching, therapeutic activities, gait training, functional ROM exercises and home exercise program  Frequency: 2x week  Duration in weeks: 12  Treatment plan discussed with: patient    History # of Personal Factors and/or Comorbidities: MODERATE (1-2)  Examination of Body System(s): # of elements: LOW (1-2)  Clinical Presentation: EVOLVING  Clinical Decision Making: MODERATE      Timed:          Manual Therapy:         mins  50394;     Therapeutic Exercise:    15     mins  28267;     Neuromuscular Дмитрий:        mins  72762;    Therapeutic Activity:          mins  30461;     Gait Training:           mins  53212;     Ultrasound:          mins  83906;    Ionto                                   mins   44529  Self Care                            mins   17711  Canalith Repos         mins 30825  Aquatic Exercise                 mins 76184    Un-Timed:  Electrical Stimulation:         mins  05265 ( );  Dry Needling          mins self-pay  Traction          mins 16721    Low Eval          Mins  37426  Mod Eval     35     Mins  92811  High Eval                            Mins  00343        Timed Treatment:   15   mins   Total Treatment:     50   mins          PT: Zorre Zeno Kimura, PT     [place] License Number: [id number]  [place] License Number:  [id number]  Electronically signed by Zorre Zeno Kimura, PT, 03/19/25, 2:20 PM EDT    Certification Period: 3/19/2025 thru 6/16/2025  I certify that the therapy services are furnished while this patient is under my care.  The services outlined above are required by this patient, and will be reviewed every 90 days.         Physician Signature:__________________________________________________    PHYSICIAN: Delta Cornell APRN  NPI: [id number]                                      DATE:      Please sign and return via fax to [employer]  [address] [place] [address]. [phone]  Thank you, [facility].

## 2025-04-23 ENCOUNTER — TREATMENT (OUTPATIENT)
Dept: PHYSICAL THERAPY | Facility: CLINIC | Age: 70
End: 2025-04-23
Payer: COMMERCIAL

## 2025-04-23 DIAGNOSIS — S46.012D TRAUMATIC INCOMPLETE TEAR OF LEFT ROTATOR CUFF, SUBSEQUENT ENCOUNTER: ICD-10-CM

## 2025-04-23 DIAGNOSIS — S42.292D CLOSED FRACTURE OF HEAD OF LEFT HUMERUS WITH ROUTINE HEALING, SUBSEQUENT ENCOUNTER: ICD-10-CM

## 2025-04-23 DIAGNOSIS — M25.612 POST-TRAUMATIC STIFFNESS OF LEFT SHOULDER JOINT: ICD-10-CM

## 2025-04-23 DIAGNOSIS — S43.005D DISLOCATION, SHOULDER CLOSED, LEFT, SUBSEQUENT ENCOUNTER: Primary | ICD-10-CM

## 2025-04-23 NOTE — PROGRESS NOTES
Physical Therapy Daily Treatment Note    [facility]  [address] [place] [address]      Patient: Izabella Jurado   : 1955  Referring practitioner: Delta Cornell, *  Date of Initial Visit: Type: THERAPY  Noted: 3/19/2025  Today's Date: 2025  Patient seen for 2 sessions       Visit Diagnoses:    ICD-10-CM ICD-9-CM   1. Dislocation, shoulder closed, left, subsequent encounter  S43.005D V58.89   2. Traumatic incomplete tear of left rotator cuff, subsequent encounter  S46.012D V58.89     840.4   3. Closed fracture of head of left humerus with routine healing, subsequent encounter  S42.292D V54.11   4. Post-traumatic stiffness of left shoulder joint  M25.612 719.51       Subjective Evaluation    History of Present Illness    Subjective comment: shoulder doing much better. lots of rest vs. wt lifting so break helped a lot. ortho released her from care.       Objective   See Exercise, Manual, and Modality Logs for complete treatment.     Passive Range of Motion   Eval       25  Left Shoulder   Flexion: 155 degrees with pain   160  Extension: 50 degrees with pain   55  Abduction: 130 degrees with pain   125  External rotation 90°: 70 degrees with pain  80  Internal rotation 90°: 65 degrees with pain  55  Total ROM               475/625=76%    Access Code: [email or web address]  URL: [email or web address]  Date: 2025  Prepared by: Zorre Kimura    Exercises  - Prone Shoulder Extension - Single Arm  - 1 x daily - 3 x weekly - 2 sets - 10 reps  - Prone Single Arm Shoulder Horizontal Abduction with Scapular Retraction and Palm Down  - 1 x daily - 3 x weekly - 2 sets - 10 reps  - Shoulder Scaption AAROM with Dowel  - 1 x daily - 7 x weekly - 2 sets - 10 reps  NEW EX  - Supine Shoulder Flexion Extension Full Range AROM  - 1 x daily - 3 x weekly - 2 sets - 10 reps - 1-2# hold  - Supine Single Arm Press with Dumbbell  - 1 x daily - 3 x weekly - 2 sets - 10 reps - 4=5# hold  - Supine  Shoulder Circles with Weight  - 1 x daily - 3 x weekly - 2 sets - 10 reps - 4-5# hold  - Sidelying Shoulder External Rotation with Dumbbell  - 1 x daily - 3 x weekly - 2 sets - 10 reps - 1-2# hold  - Sidelying Shoulder Abduction Full Range of Motion  - 1 x daily - 7 x weekly - 10 reps - 1# hold    Assessment & Plan       Assessment  Assessment details: Pt with high pain tolerance considering prior dislocation and MRI findings of inferior glenohumeral ligament tear, partial tears of supraspinatus and infraspinatus, and labral tear. All appear to be manageable with conservative care vs. Surgery per ortho so will try to offer pt progressive strengthening.   Pt wanting to do some planks and other ex with her group training class. I will discuss MRI findings and ex plan with her  which pt has authorized.     P: isometric training next visit        Goals  Plan Goals: STGs 4 weeks:  1. Improve SPADI score from 44/130 34%, to < 24%. NOT MET. Declined to 47%   2. Improve total ROM (flex, abd, ER, IR, ext) from 75 to > 85%. NOT MET. 76%  3. Improve L UE strength from 3- to 4-/5 with full ROM and very light weight. MET. Using 1-4# for supine, prone and SL ex  4. Pt 75% indep in variety of gym and HEP. Progressing  LTGs 12 weeks:  1. SPADI score < 14%. NOT MET  2. Total PROM to > 95%. NOT MET  3. L shoulder strength to 4/5 lifting moderate weights. NOT MET  4. Pt able to join her small group personal training sessions with good awareness of limitation and precautions. MET    Timed:         Manual Therapy:         mins  74116;     Therapeutic Exercise:    25     mins  98967;     Neuromuscular Дмитрий:        mins  18405;    Therapeutic Activity:          mins  30745;     Gait Training:           mins  77707;     Ultrasound:          mins  80163;    Ionto                                   mins   33022  Self Care                       10     mins   28913  [facility]         mins 47926  Work hardening   __   min  09468/36497      Un-Timed:  Electrical Stimulation:         mins  04218 ( );  Dry Needling          mins self-pay  Traction          mins 91967      Timed Treatment:   35   mins   Total Treatment:    45    mins    Zorre Zeno Kimura, PT  [place] License: [id number]    [place] License:  [id number]

## 2025-04-28 ENCOUNTER — TREATMENT (OUTPATIENT)
Dept: PHYSICAL THERAPY | Facility: CLINIC | Age: 70
End: 2025-04-28
Payer: COMMERCIAL

## 2025-04-28 DIAGNOSIS — S42.292D CLOSED FRACTURE OF HEAD OF LEFT HUMERUS WITH ROUTINE HEALING, SUBSEQUENT ENCOUNTER: ICD-10-CM

## 2025-04-28 DIAGNOSIS — M25.612 POST-TRAUMATIC STIFFNESS OF LEFT SHOULDER JOINT: ICD-10-CM

## 2025-04-28 DIAGNOSIS — S43.005D DISLOCATION, SHOULDER CLOSED, LEFT, SUBSEQUENT ENCOUNTER: Primary | ICD-10-CM

## 2025-04-28 DIAGNOSIS — S46.012D TRAUMATIC INCOMPLETE TEAR OF LEFT ROTATOR CUFF, SUBSEQUENT ENCOUNTER: ICD-10-CM

## 2025-04-28 PROCEDURE — 97110 THERAPEUTIC EXERCISES: CPT | Performed by: PHYSICAL THERAPIST

## 2025-04-28 NOTE — PROGRESS NOTES
Physical Therapy Daily Treatment Note    [facility]estone  [address] [place] [address]      Patient: Izabella Jurado   : 1955  Referring practitioner: Delta Cornell, *  Date of Initial Visit: Type: THERAPY  Noted: 3/19/2025  Today's Date: 2025  Patient seen for 3 sessions       Visit Diagnoses:    ICD-10-CM ICD-9-CM   1. Dislocation, shoulder closed, left, subsequent encounter  S43.005D V58.89   2. Traumatic incomplete tear of left rotator cuff, subsequent encounter  S46.012D V58.89     840.4   3. Closed fracture of head of left humerus with routine healing, subsequent encounter  S42.292D V54.11   4. Post-traumatic stiffness of left shoulder joint  M25.612 719.51       Subjective Evaluation    History of Present Illness    Subjective comment: feeling good. doing light workouts with group plus her HEP with 3#       Objective   See Exercise, Manual, and Modality Logs for complete treatment.   Access Code: [email or web address]  URL: [email or web address]  Date: 2025  Prepared by: Zorre Kimura    Exercises  - Prone Shoulder Extension - Single Arm  - 1 x daily - 3 x weekly - 2 sets - 10 reps  - Prone Single Arm Shoulder Horizontal Abduction with Scapular Retraction and Palm Down  - 1 x daily - 3 x weekly - 2 sets - 10 reps  - Shoulder Scaption AAROM with Dowel  - 1 x daily - 7 x weekly - 2 sets - 10 reps  - Supine Shoulder Flexion Extension Full Range AROM  - 1 x daily - 3 x weekly - 2 sets - 10 reps - 1-2# hold  - Supine Single Arm Press with Dumbbell  - 1 x daily - 3 x weekly - 2 sets - 10 reps - 4=5# hold  - Supine Shoulder Circles with Weight  - 1 x daily - 3 x weekly - 2 sets - 10 reps - 4-5#. start doing incline work hold  - Sidelying Shoulder External Rotation with Dumbbell  - 1 x daily - 3 x weekly - 2 sets - 10 reps - 1-2# hold  - Sidelying Shoulder Abduction Full Range of Motion  - 1 x daily - 7 x weekly - 10 reps - 1# hold  NEW EX  - Sidelying Shoulder Abduction Palm  Forward  - 1 x daily - 7 x weekly - 2 sets - 10 reps - circles. 2# hold  - Seated Elbow Flexion and Extension AROM  - 1 x daily - 7 x weekly - 2 sets - 10 reps - 4-5# hold  - Standing Tricep Extensions with Resistance  - 1 x daily - 7 x weekly - 2 sets - 10 reps - cable hold  - Standing Shoulder External Rotation AAROM with Dowel  - 1 x daily - 7 x weekly - 2 sets - 10 reps      Assessment & Plan       Assessment  Assessment details: Reviewed ex and added new ex. Focus on making sure pt understands she needs to use very specific wts for each ex vs. 3# which she was starting to do. Went over anatomy of the function of her RC with inherent weakness of supra and infraspinatus.     Added KT tape with improved OH reach to hair.     P: work on functional walk outs, walk back for isometrics          Timed:         Manual Therapy:    5     mins  21870;     Therapeutic Exercise:    40     mins  81775;     Neuromuscular Дмитрий:        mins  42617;    Therapeutic Activity:          mins  26118;     Gait Training:           mins  43737;     Ultrasound:          mins  46599;    Ionto                                   mins   20663  Self Care                            mins   92617  Canalith Repos         mins 19033  Work hardening   __   min 71623/28000      Un-Timed:  Electrical Stimulation:         mins  52912 ( );  Dry Needling          mins self-pay  Traction          mins 91238      Timed Treatment:   45   mins   Total Treatment:     45   mins    Zorre Zeno Kimura, PT  [place] License: [id number]    [place] License:  [id number]

## 2025-05-05 ENCOUNTER — TREATMENT (OUTPATIENT)
Dept: PHYSICAL THERAPY | Facility: CLINIC | Age: 70
End: 2025-05-05
Payer: COMMERCIAL

## 2025-05-05 DIAGNOSIS — S42.292D CLOSED FRACTURE OF HEAD OF LEFT HUMERUS WITH ROUTINE HEALING, SUBSEQUENT ENCOUNTER: ICD-10-CM

## 2025-05-05 DIAGNOSIS — S43.005D DISLOCATION, SHOULDER CLOSED, LEFT, SUBSEQUENT ENCOUNTER: Primary | ICD-10-CM

## 2025-05-05 DIAGNOSIS — M25.612 POST-TRAUMATIC STIFFNESS OF LEFT SHOULDER JOINT: ICD-10-CM

## 2025-05-05 DIAGNOSIS — S46.012D TRAUMATIC INCOMPLETE TEAR OF LEFT ROTATOR CUFF, SUBSEQUENT ENCOUNTER: ICD-10-CM

## 2025-05-05 PROCEDURE — 97140 MANUAL THERAPY 1/> REGIONS: CPT | Performed by: PHYSICAL THERAPIST

## 2025-05-05 PROCEDURE — 97110 THERAPEUTIC EXERCISES: CPT | Performed by: PHYSICAL THERAPIST

## 2025-05-05 NOTE — PROGRESS NOTES
Physical Therapy Daily Treatment Note    [facility]  [address] [place] [address]      Patient: Izabella Jurado   : 1955  Referring practitioner: Delta Cornell, *  Date of Initial Visit: Type: THERAPY  Noted: 3/19/2025  Today's Date: 2025  Patient seen for 4 sessions       Visit Diagnoses:    ICD-10-CM ICD-9-CM   1. Dislocation, shoulder closed, left, subsequent encounter  S43.005D V58.89   2. Traumatic incomplete tear of left rotator cuff, subsequent encounter  S46.012D V58.89     840.4   3. Closed fracture of head of left humerus with routine healing, subsequent encounter  S42.292D V54.11   4. Post-traumatic stiffness of left shoulder joint  M25.612 719.51       Subjective Evaluation    History of Present Illness    Subjective comment: feeling better. likes KT tape. ROM improving. Wants to try hand stand for yoga.       Objective   See Exercise, Manual, and Modality Logs for complete treatment.   Access Code: [email or web address]  URL: [email or web address]  Date: 2025  Prepared by: Zorre Kimura    Exercises  - Prone Shoulder Extension - Single Arm  - 1 x daily - 3 x weekly - 2 sets - 10 reps  - Prone Single Arm Shoulder Horizontal Abduction with Scapular Retraction and Palm Down  - 1 x daily - 3 x weekly - 2 sets - 10 reps  - Shoulder Scaption AAROM with Dowel  - 1 x daily - 7 x weekly - 2 sets - 10 reps  - Supine Shoulder Flexion Extension Full Range AROM  - 1 x daily - 3 x weekly - 2 sets - 10 reps - 1-2# hold  - Supine Single Arm Press with Dumbbell  - 1 x daily - 3 x weekly - 2 sets - 10 reps - 4=5# hold  - Supine Shoulder Circles with Weight  - 1 x daily - 3 x weekly - 2 sets - 10 reps - 4-5#. start doing incline work hold  - Sidelying Shoulder External Rotation with Dumbbell  - 1 x daily - 3 x weekly - 2 sets - 10 reps - 1-2# hold  - Sidelying Shoulder Abduction Full Range of Motion  - 1 x daily - 7 x weekly - 10 reps - 1# hold  - Sidelying Shoulder Abduction Palm  Forward  - 1 x daily - 7 x weekly - 2 sets - 10 reps - circles. 2# hold  - Seated Elbow Flexion and Extension AROM  - 1 x daily - 7 x weekly - 2 sets - 10 reps - 4-5# hold  - Standing Tricep Extensions with Resistance  - 1 x daily - 7 x weekly - 2 sets - 10 reps - cable hold  - Standing Shoulder External Rotation AAROM with Dowel  - 1 x daily - 7 x weekly - 2 sets - 10 reps  NEW ex  - Shoulder Internal Rotation Reactive Isometrics  - 1 x daily - 7 x weekly - 10 reps - med wt hold  - Shoulder External Rotation Reactive Isometrics  - 1 x daily - 7 x weekly - 10 reps - lt wt hold  - Standing Shoulder Row with Anchored Resistance  - 1 x daily - 7 x weekly - 10 reps - heavier wt. reactive steps hold  - Sidelying Open Book Thoracic Lumbar Rotation and Extension  - 1 x daily - 7 x weekly - 10 reps  - Supine Chest Stretch on Foam Roll  - 1 x daily - 7 x weekly - 10 reps  - Supine Thoracic Mobilization Foam Roll Horizontal with Arm Stretch  - 1 x daily - 7 x weekly - 10 reps  - Overhead Reach on Foam Roll  - 1 x daily - 7 x weekly - 10 reps    Assessment & Plan       Assessment  Assessment details: Much better ROM and addressed scapular tightness causing crepitus with abduction secondary to delayed upward rotation.   Deep tissue work done along with ex on full and half foam roll today.   Will get a massage from a therapist here. Cont to work on posture and scap retraction.   Reaching goals quickly and recommending she get her strength back before hand stands.           Timed:         Manual Therapy:    13     mins  17413;     Therapeutic Exercise:    25     mins  96589;     Neuromuscular Дмитрий:        mins  06985;    Therapeutic Activity:          mins  58967;     Gait Training:           mins  35719;     Ultrasound:          mins  87551;    Ionto                                   mins   20502  Self Care                            mins   33859  Canalith Repos         mins 74026  Work hardening   __   min  68314/95064      Un-Timed:  Electrical Stimulation:         mins  23388 ( );  Dry Needling          mins self-pay  Traction          mins 84328      Timed Treatment:   38   mins   Total Treatment:     48   mins    Zorre Zeno Kimura, PT  [place] License: [id number]    [place] License:  [id number]

## 2025-05-07 ENCOUNTER — TREATMENT (OUTPATIENT)
Dept: PHYSICAL THERAPY | Facility: CLINIC | Age: 70
End: 2025-05-07
Payer: COMMERCIAL

## 2025-05-07 DIAGNOSIS — S43.005D DISLOCATION, SHOULDER CLOSED, LEFT, SUBSEQUENT ENCOUNTER: Primary | ICD-10-CM

## 2025-05-07 DIAGNOSIS — S46.012D TRAUMATIC INCOMPLETE TEAR OF LEFT ROTATOR CUFF, SUBSEQUENT ENCOUNTER: ICD-10-CM

## 2025-05-07 DIAGNOSIS — M25.612 POST-TRAUMATIC STIFFNESS OF LEFT SHOULDER JOINT: ICD-10-CM

## 2025-05-07 DIAGNOSIS — S42.292D CLOSED FRACTURE OF HEAD OF LEFT HUMERUS WITH ROUTINE HEALING, SUBSEQUENT ENCOUNTER: ICD-10-CM

## 2025-05-07 NOTE — PROGRESS NOTES
Physical Therapy Daily Treatment Note    [facility]  [address] [place] [address]      Patient: Izabella Jurado   : 1955  Referring practitioner: Delta Cornell, *  Date of Initial Visit: Type: THERAPY  Noted: 3/19/2025  Today's Date: 2025  Patient seen for 5 sessions       Visit Diagnoses:    ICD-10-CM ICD-9-CM   1. Dislocation, shoulder closed, left, subsequent encounter  S43.005D V58.89   2. Traumatic incomplete tear of left rotator cuff, subsequent encounter  S46.012D V58.89     840.4   3. Closed fracture of head of left humerus with routine healing, subsequent encounter  S42.292D V54.11   4. Post-traumatic stiffness of left shoulder joint  M25.612 719.51       Subjective Evaluation    History of Present Illness    Subjective comment: getting stronger and feels she can cut back on PT. Worked with group ex today and dead lifted 85# today. Previously 140#. 3# OH lift on the L vs. 15#.     Objective   See Exercise, Manual, and Modality Logs for complete treatment.       Assessment & Plan       Assessment  Assessment details: Pt tired and a little sore from group ex earlier today so no ex during PT today.  Thoracic and scapular tension continues. Pt working on her posture and recommended postural shirts or devices if she can't reset her head on her thoracic spine better.   Pushing herself quite a bit with group ex but states she is being careful.    P: review function of cuff again and ex that could possibly extend her tear (IR stretching, long arm lifts, upright rows)          Timed:         Manual Therapy:    30     mins  21181;     Therapeutic Exercise:         mins  91045;     Neuromuscular Дмитрий:        mins  88922;    Therapeutic Activity:          mins  82587;     Gait Training:           mins  07931;     Ultrasound:          mins  46910;    Ionto                                   mins   22369  Self Care                       10     mins   15722  [facility]         mins  38902  Work hardening   __   min 85584/67610      Un-Timed:  Electrical Stimulation:         mins  36762 ( );  Dry Needling          mins self-pay  Traction          mins 12730      Timed Treatment:   40   mins   Total Treatment:     40   mins    Zorre Zeno Kimura, PT  [place] License: [id number]    [place] License:  [id number]

## 2025-05-12 ENCOUNTER — TREATMENT (OUTPATIENT)
Dept: PHYSICAL THERAPY | Facility: CLINIC | Age: 70
End: 2025-05-12
Payer: COMMERCIAL

## 2025-05-12 DIAGNOSIS — M25.612 POST-TRAUMATIC STIFFNESS OF LEFT SHOULDER JOINT: ICD-10-CM

## 2025-05-12 DIAGNOSIS — S42.292D CLOSED FRACTURE OF HEAD OF LEFT HUMERUS WITH ROUTINE HEALING, SUBSEQUENT ENCOUNTER: ICD-10-CM

## 2025-05-12 DIAGNOSIS — S43.005D DISLOCATION, SHOULDER CLOSED, LEFT, SUBSEQUENT ENCOUNTER: Primary | ICD-10-CM

## 2025-05-12 DIAGNOSIS — S46.012D TRAUMATIC INCOMPLETE TEAR OF LEFT ROTATOR CUFF, SUBSEQUENT ENCOUNTER: ICD-10-CM

## 2025-05-12 PROCEDURE — 97110 THERAPEUTIC EXERCISES: CPT | Performed by: PHYSICAL THERAPIST

## 2025-05-12 PROCEDURE — 97535 SELF CARE MNGMENT TRAINING: CPT | Performed by: PHYSICAL THERAPIST

## 2025-05-12 NOTE — PROGRESS NOTES
Physical Therapy Daily Treatment Note    [facility]  [address] [place] [address]      Patient: Izabella Jurado   : 1955  Referring practitioner: Delta Cornell, *  Date of Initial Visit: Type: THERAPY  Noted: 3/19/2025  Today's Date: 2025  Patient seen for 6 sessions       Visit Diagnoses:    ICD-10-CM ICD-9-CM   1. Dislocation, shoulder closed, left, subsequent encounter  S43.005D V58.89   2. Traumatic incomplete tear of left rotator cuff, subsequent encounter  S46.012D V58.89     840.4   3. Closed fracture of head of left humerus with routine healing, subsequent encounter  S42.292D V54.11   4. Post-traumatic stiffness of left shoulder joint  M25.612 719.51       Subjective Evaluation    History of Present Illness    Subjective comment: doing well. Working out with group. Discussed precautions for long arm motion, IR stretching, up right row. Pulling on her yoga pants is getting easier. Massage last week found quite a bit of trigger points and will go back again.       Objective   See Exercise, Manual, and Modality Logs for complete treatment.     Eval                                                                             25  Left Shoulder   Flexion: 155 degrees with pain                                   160    175  Extension: 50 degrees with pain                                55    55  Abduction: 130 degrees with pain                              125    145  External rotation 90°: 70 degrees with pain               80    85  Internal rotation 90°: 65 degrees with pain                 55    65  Total ROM                                                                  475/625=76%   525/625=84%    Assessment & Plan       Assessment  Assessment details: Doing well. ROM now 86%.  Added on sh ext ex, ski rope, fire rope horiz vs. Vertical, low rows.     P: try body blade and ball on wall for endurance ex.           Timed:         Manual Therapy:         mins   04818;     Therapeutic Exercise:    30     mins  16673;     Neuromuscular Дмитрий:        mins  32463;    Therapeutic Activity:          mins  04313;     Gait Training:           mins  48931;     Ultrasound:          mins  82312;    Ionto                                   mins   20228  Self Care                       10     mins   09364  Canalith Repos         mins 61947  Work hardening   __   min 79350/62534      Un-Timed:  Electrical Stimulation:         mins  50324 ( );  Dry Needling          mins self-pay  Traction          mins 64223      Timed Treatment:   40   mins   Total Treatment:     40   mins    Zorre Zeno Kimura, PT  [place] License: [id number]    [place] License:  [id number]

## 2025-05-27 ENCOUNTER — TREATMENT (OUTPATIENT)
Dept: PHYSICAL THERAPY | Facility: CLINIC | Age: 70
End: 2025-05-27
Payer: COMMERCIAL

## 2025-05-27 DIAGNOSIS — S43.005D DISLOCATION, SHOULDER CLOSED, LEFT, SUBSEQUENT ENCOUNTER: Primary | ICD-10-CM

## 2025-05-27 DIAGNOSIS — S46.012D TRAUMATIC INCOMPLETE TEAR OF LEFT ROTATOR CUFF, SUBSEQUENT ENCOUNTER: ICD-10-CM

## 2025-05-27 DIAGNOSIS — M25.612 POST-TRAUMATIC STIFFNESS OF LEFT SHOULDER JOINT: ICD-10-CM

## 2025-05-27 DIAGNOSIS — S42.292D CLOSED FRACTURE OF HEAD OF LEFT HUMERUS WITH ROUTINE HEALING, SUBSEQUENT ENCOUNTER: ICD-10-CM

## 2025-05-27 PROCEDURE — 97535 SELF CARE MNGMENT TRAINING: CPT | Performed by: PHYSICAL THERAPIST

## 2025-05-27 PROCEDURE — 97110 THERAPEUTIC EXERCISES: CPT | Performed by: PHYSICAL THERAPIST

## 2025-05-27 NOTE — PROGRESS NOTES
Physical Therapy Daily Treatment Note    [facility]  [address] [place] [address]      Patient: Izabella Jurado   : 1955  Referring practitioner: Delta Cornell, *  Date of Initial Visit: Type: THERAPY  Noted: 3/19/2025  Today's Date: 2025  Patient seen for 7 sessions       Visit Diagnoses:    ICD-10-CM ICD-9-CM   1. Dislocation, shoulder closed, left, subsequent encounter  S43.005D V58.89   2. Traumatic incomplete tear of left rotator cuff, subsequent encounter  S46.012D V58.89     840.4   3. Closed fracture of head of left humerus with routine healing, subsequent encounter  S42.292D V54.11   4. Post-traumatic stiffness of left shoulder joint  M25.612 719.51       Subjective Evaluation    History of Present Illness    Subjective comment: lifting a bit more with her group training sessions. Getting more ROM. Biggest issue is reaching L to R under her arm to apply deodorant. Agrees to switch to spray if needed.       Objective   See Exercise, Manual, and Modality Logs for complete treatment.     Passive Range of Motion   Eval                                                                             25  Left Shoulder   Flexion: 155 degrees with pain                                   160    165  Extension: 50 degrees with pain                                55    80  Abduction: 130 degrees with pain                              125    150  External rotation 90°: 70 degrees with pain               80    90  Internal rotation 90°: 65 degrees with pain                 55    60  Total ROM                                                                  475/625=76%   545/625=87%    Strength 4/5 lifting light weights from 3-5#.       Assessment & Plan       Assessment  Assessment details: Pt seen for her 7th session today and ROM improving to 87% with strength more like 80% although when using dumb bells for comparison, lifting only 50% of max or R UE.    Did well  progressing to body blade ex today using standard classic size. For home ex, pt might benefit from lighter and standard size.     Will see again in 3 weeks to progress her program. Flexion ROM limited at GH joint and getting her OH motion via more scapular rotation and thoracic extension at end range.   No plans to block this and do any aggressive GH mobs as R shoulder also compensates in a similar pattern.         Goals  Plan Goals: STGs 4 weeks:  1. Improve SPADI score from 44/130 34%, to <25%.   2. Improve total ROM (flex, abd, ER, IR, ext) from 75 to > 85%. MET 87%  3. Improve L UE strength from 3- to 4-/5 with full ROM and very light weight. MET. Using 1-4# for supine, prone and SL ex  4. Pt 75% indep in variety of gym and HEP. MET  LTGs 12 weeks:  1. SPADI score < 14%. NOT MET  2. Total PROM to > 95%. NOT MET  3. L shoulder strength to 4/5 lifting moderate weights. Partially MET lifting just light weights for safety right now  4. Pt able to join her small group personal training sessions with good awareness of limitation and precautions. MET      Timed:         Manual Therapy:         mins  06851;     Therapeutic Exercise:    30    mins  80894;     Neuromuscular Дмитрий:        mins  16208;    Therapeutic Activity:          mins  45464;     Gait Training:           mins  40890;     Ultrasound:          mins  79402;    Ionto                                   mins   74250  Self Care                       15     mins   52312  Canalith Repos         mins 88130  Work hardening   __   min 78126/31166      Un-Timed:  Electrical Stimulation:         mins  33163 ( );  Dry Needling          mins self-pay  Traction          mins 06828      Timed Treatment:  45    mins   Total Treatment:     45   mins    Zorre Zeno Kimura, PT  [place] License: [id number]    [place] License:  [id number]

## 2025-06-20 ENCOUNTER — TREATMENT (OUTPATIENT)
Dept: PHYSICAL THERAPY | Facility: CLINIC | Age: 70
End: 2025-06-20
Payer: COMMERCIAL

## 2025-06-20 DIAGNOSIS — S42.292D CLOSED FRACTURE OF HEAD OF LEFT HUMERUS WITH ROUTINE HEALING, SUBSEQUENT ENCOUNTER: ICD-10-CM

## 2025-06-20 DIAGNOSIS — S43.005D DISLOCATION, SHOULDER CLOSED, LEFT, SUBSEQUENT ENCOUNTER: Primary | ICD-10-CM

## 2025-06-20 DIAGNOSIS — M25.612 POST-TRAUMATIC STIFFNESS OF LEFT SHOULDER JOINT: ICD-10-CM

## 2025-06-20 DIAGNOSIS — S46.012D TRAUMATIC INCOMPLETE TEAR OF LEFT ROTATOR CUFF, SUBSEQUENT ENCOUNTER: ICD-10-CM

## 2025-06-20 NOTE — PROGRESS NOTES
Physical Therapy Daily Treatment Note/discharge summary    [facility]  [address] [place] [address]      Patient: Izabella Jurado   : 1955  Referring practitioner: Delta Cornell, *  Date of Initial Visit: Type: THERAPY  Noted: 3/19/2025  Today's Date: 2025  Patient seen for 8 sessions       Visit Diagnoses:    ICD-10-CM ICD-9-CM   1. Dislocation, shoulder closed, left, subsequent encounter  S43.005D V58.89   2. Traumatic incomplete tear of left rotator cuff, subsequent encounter  S46.012D V58.89     840.4   3. Closed fracture of head of left humerus with routine healing, subsequent encounter  S42.292D V54.11   4. Post-traumatic stiffness of left shoulder joint  M25.612 719.51       Subjective Evaluation    History of Present Illness    Subjective comment: Getting stronger slowly and ready for dc today. Helping her spouse recover from spinal surgery and trying to be careful with any lifting. Using 2-5# and being aware of lifting heavy away from her body. Able to adduct and put her deodorant on now. Still limited IR behind back and instructed not to force this ROM.       Objective   See Exercise, Manual, and Modality Logs for complete treatment.   Flex 180, abd 160, Ext 50 (was 80), ER 95, IR 50 (was 60). Total ROM 86%    Assessment & Plan       Assessment  Assessment details: Pt has mild to moderate pain and still working out. Feels that her pain levels are acceptable and does not want to give up working out. Agrees to avoid extreme extension and IR motion that causes pain. In the past month gained flex, abd and ER PROM, while losing ext and IR motion.   Reminded pt of anatomy of Supraspinatus and the stress IR puts on this structure.   Pt given info on shoulder support and she likes the fit and feel of our sample from [facility]. She will also look at the [facility] support.   Also given info on CBD oil and precaution regarding possible drop in BP as she already is on the low end  of BP.     Pt indep with her HEP and ready for dc today.       Goals  Plan Goals: STGs 4 weeks:  1. Improve SPADI score from 44/130 34%, to <25%. MET 19%  2. Improve total ROM (flex, abd, ER, IR, ext) from 75 to > 85%. /625 = 86%  3. Improve L UE strength from 3- to 4-/5 with full ROM and very light weight. MET. Using 2-5# for supine, prone and SL ex  4. Pt 75% indep in variety of gym and HEP. MET  LTGs 12 weeks:  1. SPADI score < 14%. NOT MET. 19%  2. Total PROM to > 95%. NOT MET  3. L shoulder strength to 4/5 lifting moderate weights. Partially MET lifting just light weights for safety right now.   4. Pt able to join her small group personal training sessions with good awareness of limitation and precautions. MET       Timed:         Manual Therapy:         mins  44265;     Therapeutic Exercise:         mins  84084;     Neuromuscular Дмитрий:        mins  38752;    Therapeutic Activity:          mins  71129;     Gait Training:           mins  38594;     Ultrasound:          mins  50523;    Ionto                                   mins   09021  Self Care                       30   mins   73789  Canalith Repos         mins 96564  Work hardening   __   min 12384/54761      Un-Timed:  Electrical Stimulation:         mins  55532 ( );  Dry Needling          mins self-pay  Traction          mins 75802      Timed Treatment:   30   mins   Total Treatment:     30   mins    Zorre Zeno Kimura, PT  [place] License: [id number]    [place] License:  [id number]

## (undated) DEVICE — THE TORRENT IRRIGATION SCOPE CONNECTOR IS USED WITH THE TORRENT IRRIGATION TUBING TO PROVIDE IRRIGATION FLUIDS SUCH AS STERILE WATER DURING GASTROINTESTINAL ENDOSCOPIC PROCEDURES WHEN USED IN CONJUNCTION WITH AN IRRIGATION PUMP (OR ELECTROSURGICAL UNIT).: Brand: TORRENT

## (undated) DEVICE — TUBING, SUCTION, 1/4" X 10', STRAIGHT: Brand: MEDLINE

## (undated) DEVICE — Device: Brand: DEFENDO AIR/WATER/SUCTION AND BIOPSY VALVE

## (undated) DEVICE — TBG 02 CRUSH RESIST LF CLR 7FT

## (undated) DEVICE — CANN NASL CO2 TRULINK W/O2 A/